# Patient Record
Sex: MALE | Race: WHITE | NOT HISPANIC OR LATINO | URBAN - METROPOLITAN AREA
[De-identification: names, ages, dates, MRNs, and addresses within clinical notes are randomized per-mention and may not be internally consistent; named-entity substitution may affect disease eponyms.]

---

## 2018-02-27 ENCOUNTER — BCC (OUTPATIENT)
Dept: URBAN - METROPOLITAN AREA CLINIC 25 | Facility: CLINIC | Age: 78
Setting detail: DERMATOLOGY
End: 2018-02-27

## 2018-02-27 DIAGNOSIS — D48.5 NEOPLASM OF UNCERTAIN BEHAVIOR OF SKIN: ICD-10-CM

## 2018-02-27 PROBLEM — C44.212 BASAL CELL CARCINOMA OF SKIN OF RIGHT EAR AND EXTERNAL AURICULAR CANAL: Status: RESOLVED | Noted: 2018-02-27

## 2018-02-27 PROCEDURE — 17312 MOHS ADDL STAGE: CPT

## 2018-02-27 PROCEDURE — 15260 FTH/GFT FR N/E/E/L 20 SQCM/<: CPT

## 2018-02-27 PROCEDURE — 17311 MOHS 1 STAGE H/N/HF/G: CPT

## 2021-01-01 ENCOUNTER — RX ONLY (OUTPATIENT)
Age: 81
Setting detail: RX ONLY
End: 2021-01-01

## 2021-01-01 ENCOUNTER — APPOINTMENT (RX ONLY)
Dept: URBAN - METROPOLITAN AREA CLINIC 12 | Facility: CLINIC | Age: 81
Setting detail: DERMATOLOGY
End: 2021-01-01

## 2021-01-01 DIAGNOSIS — D485 NEOPLASM OF UNCERTAIN BEHAVIOR OF SKIN: ICD-10-CM

## 2021-01-01 PROCEDURE — ? TREATMENT REGIMEN

## 2021-01-01 PROCEDURE — 99202 OFFICE O/P NEW SF 15 MIN: CPT

## 2021-01-01 PROCEDURE — ? OTHER

## 2021-01-01 PROCEDURE — ? ORDER TESTS

## 2021-01-01 PROCEDURE — ? COUNSELING

## 2021-01-01 PROCEDURE — ? PRESCRIPTION

## 2021-01-01 PROCEDURE — ? DIAGNOSIS COMMENT

## 2021-01-01 RX ORDER — KETOCONAZOLE 20 MG/G
CREAM TOPICAL
Qty: 1 | Refills: 1 | Status: ACTIVE

## 2021-01-01 RX ORDER — KETOCONAZOLE 20 MG/G
CREAM TOPICAL
Qty: 1 | Refills: 0 | COMMUNITY
Start: 2021-01-01

## 2021-01-01 RX ORDER — KETOCONAZOLE 20 MG/G
CREAM TOPICAL
Qty: 1 | Refills: 0 | Status: ACTIVE

## 2021-01-01 RX ORDER — MUPIROCIN 20 MG/G
OINTMENT TOPICAL
Qty: 1 | Refills: 0 | Status: CANCELLED

## 2021-01-01 ASSESSMENT — LOCATION DETAILED DESCRIPTION DERM: LOCATION DETAILED: RIGHT CYMBA CONCHA

## 2021-01-01 ASSESSMENT — LOCATION SIMPLE DESCRIPTION DERM: LOCATION SIMPLE: RIGHT EAR

## 2021-01-01 ASSESSMENT — LOCATION ZONE DERM: LOCATION ZONE: EAR

## 2021-05-04 PROBLEM — D48.5 NEOPLASM OF UNCERTAIN BEHAVIOR OF SKIN: Status: ACTIVE | Noted: 2021-01-01

## 2021-05-04 NOTE — PROCEDURE: DIAGNOSIS COMMENT
Comment: eschar debrided to reveal slight maceration and shallow ulceration inside the triangular fossa of ear under fold of ear.
Render Risk Assessment In Note?: no
Detail Level: Simple

## 2021-05-04 NOTE — PROCEDURE: TREATMENT REGIMEN
Plan: discussed option for biopsy vs culture with wound care.\\nif lesion fails to heal with wound care (+culture result) would advise biopsy to r/o BCC or SCC.
Detail Level: Zone

## 2021-05-04 NOTE — PROCEDURE: OTHER
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 Other (Free Text): scab inside ear for several months.\\ndenies hx of treatment\\nhx of BCC of right ear many years ago tx with [facility] at [place].

## 2021-05-04 NOTE — HPI: SKIN LESION
What Type Of Note Output Would You Prefer (Optional)?: Bullet Format
How Severe Is Your Skin Lesion?: mild
Has Your Skin Lesion Been Treated?: not been treated
Is This A New Presentation, Or A Follow-Up?: Skin Lesion
Additional History: Pognnmxji-XWvf-Gvya Ann.\\n\\nDeclines fbe.\\n\\n